# Patient Record
Sex: FEMALE | URBAN - NONMETROPOLITAN AREA
[De-identification: names, ages, dates, MRNs, and addresses within clinical notes are randomized per-mention and may not be internally consistent; named-entity substitution may affect disease eponyms.]

---

## 2023-09-25 ENCOUNTER — APPOINTMENT (RX ONLY)
Dept: URBAN - NONMETROPOLITAN AREA CLINIC 13 | Facility: CLINIC | Age: 78
Setting detail: DERMATOLOGY
End: 2023-09-25

## 2023-09-25 DIAGNOSIS — Z41.9 ENCOUNTER FOR PROCEDURE FOR PURPOSES OTHER THAN REMEDYING HEALTH STATE, UNSPECIFIED: ICD-10-CM

## 2023-09-25 PROCEDURE — ? WAXING

## 2023-09-25 PROCEDURE — ? FACIAL

## 2023-09-25 ASSESSMENT — LOCATION ZONE DERM
LOCATION ZONE: FACE
LOCATION ZONE: FACE

## 2023-09-25 ASSESSMENT — LOCATION DETAILED DESCRIPTION DERM
LOCATION DETAILED: RIGHT CENTRAL EYEBROW
LOCATION DETAILED: INFERIOR MID FOREHEAD
LOCATION DETAILED: LEFT CENTRAL EYEBROW

## 2023-09-25 ASSESSMENT — LOCATION SIMPLE DESCRIPTION DERM
LOCATION SIMPLE: INFERIOR FOREHEAD
LOCATION SIMPLE: RIGHT EYEBROW
LOCATION SIMPLE: LEFT EYEBROW

## 2023-09-25 NOTE — PROCEDURE: WAXING
Price (Use Numbers Only, No Special Characters Or $): 21
Techique: The unwanted hair in the treatment area(s) were removed using wax and tweezing
Detail Level: Zone

## 2023-09-25 NOTE — PROCEDURE: FACIAL
Exfoliation Type: scrub
Detail Level: Zone
Treatment Type (Optional): Spa Facial
Facial Steaming: steamed
Price (Use Numbers Only, No Special Characters Or $): 95

## 2024-10-01 ENCOUNTER — APPOINTMENT (RX ONLY)
Dept: URBAN - NONMETROPOLITAN AREA CLINIC 13 | Facility: CLINIC | Age: 79
Setting detail: DERMATOLOGY
End: 2024-10-01

## 2024-10-01 DIAGNOSIS — Z41.9 ENCOUNTER FOR PROCEDURE FOR PURPOSES OTHER THAN REMEDYING HEALTH STATE, UNSPECIFIED: ICD-10-CM

## 2024-10-01 PROCEDURE — ? HYDRAFACIAL

## 2024-10-01 NOTE — PROCEDURE: HYDRAFACIAL
Procedure: Boost
Vacuum Pressure Low Setting (Will Not Render If Set To 0): 16
Procedure: Exfoliation
Tip: Hydropeel Tip, Clear
Comments: Added Eye perk.
Number Of Passes Step 2: 1
Procedure: Extend and Protect
Location: face
Number Of Passes Step 3: 2
Vacuum Pressure High Setting (Will Not Render If Set To 0): 0
Consent: Written consent obtained, risks reviewed including but not limited to crusting, scabbing, blistering, scarring, darker or lighter pigmentary change, bruising, and/or incomplete response.
Tip Override
Solution: Beta-HD
Solution: GlySal 7.5%
Post-Care Instructions: I reviewed with the patient in detail post-care instructions. Patient should stay away from the sun and wear sun protection until treated areas are fully healed.
Solution Override
Solution: Activ-4
Solution: Antiox-6
Price (Use Numbers Only, No Special Characters Or $): 790
Procedure: Extraction
Procedure: Peel
Tip: Hydropeel Tip, Teal
Tip: Hydropeel Tip, Blue
Indication: anti-aging
Solution Override: growth factor
Vacuum Pressure Low Setting (Will Not Render If Set To 0): 20